# Patient Record
Sex: FEMALE | Race: WHITE | NOT HISPANIC OR LATINO | Employment: FULL TIME | URBAN - METROPOLITAN AREA
[De-identification: names, ages, dates, MRNs, and addresses within clinical notes are randomized per-mention and may not be internally consistent; named-entity substitution may affect disease eponyms.]

---

## 2018-01-09 NOTE — MISCELLANEOUS
Message   Recorded as Task   Date: 09/19/2016 03:30 PM, Created By: Kalin King   Task Name: Follow Up   Assigned To: [id number] [address] clinical,Team   Regarding Patient: Liz Pablo Status: Active   CommentBecky Lanier - 19 Sep 2016 3:30 PM     TASK CREATED  Pt  is S/P RT INTRA-ARTICULAR HIP INJ/BANZHOF on 9/14 by Dr Tasha Barragan  F/U is on 10 /03   Kalin King - 21 Sep 2016 11:59 AM     TASK EDITED   1st attempt to call, no answer  LMOM for CB  Leonid Carter - 26 Sep 2016 5:21 PM     TASK EDITED   2nd attempt to call, no answer  LMOM for CB  Leonid Carter - 27 Sep 2016 8:28 AM     TASK EDITED  Please send [address] letter  Leonid Carter - 27 Sep 2016 8:28 AM     TASK Ivis Rooney - 29 Sep 2016 10:22 AM     TASK Kizzy Plaza - 04 Oct 2016 11:25 AM     TASK REACTIVATED   Kalin King - 04 Oct 2016 11:27 AM     TASK EDITED  Pt called to report 95% relief post inj, with no s/s of infection and greatly improved ability to perform ADL's  Pt  f/u was on 10/03 with Dr Larry Granger which was a [facility] Class - 10 Oct 2016 7:50 AM     TASK REPLIED TO: Previously Assigned To [id number] [address] clinical,Team                      aware agree        Active Problems    1  Abnormal blood sugar (790 29) (R73 09)   2  Degenerative tear of acetabular labrum of right hip (715 95) (M16 9)   3  Essential familial hyperlipidemia (272 2) (E78 4)   4  Hip abductor tendinitis, right (726 5) (M76 891)   5  Hip pain, right (719 45) (M25 551)   6  Screening for breast cancer (V76 10) (Z12 39)   7  Screening for cholesterol level (V77 91) (Z13 220)   8  Screening for colon cancer (V76 51) (Z12 11)    Current Meds   1  Meloxicam 15 MG Oral Tablet; TAKE 1 TABLET DAILY WITH FOOD; Therapy: 03KOL9066 to (Evaluate:77Bwb4265); Last Rx:[id number] Ordered    Allergies    1   Tetracycline HCl CAPS    Signatures   Electronically signed by : Devon Schmitt RN; Oct 10 2016  8:47AM EST (Author)

## 2018-01-12 NOTE — RESULT NOTES
Verified Results  * XR HIP 2+ VIEW RIGHT [id number] 10:26AM Bevelyn Finer Order Number: [id number]     Test Name Result Flag Reference   * XR HIP/PELV 2-3 VWS RIGHT (Report)     RIGHT HIP     INDICATION: Lateral hip pain  Persistent pain after fall skiing  COMPARISON: None     VIEWS: AP pelvis and 2 coned down views of the hip; 3 images     FINDINGS:     There is no acute fracture or dislocation  Calcifications are noted about the hip joint which may suggest calcific tendinitis  Patient is also status post laminectomy at L5  No lytic or blastic lesions are seen  Soft tissues are unremarkable  IMPRESSION:     Degenerative changes right hip  No acute injury         Workstation performed: [id number]     Signed by:   Reyna Flores MD   7/7/16       Plan  Hip pain, right    · *1 - [facility]  Consult  Status: Hold For - Scheduling   Requested for: 49LGH0184  Care Summary provided  : Yes

## 2018-01-13 NOTE — RESULT NOTES
Message   Recorded as Task   Date: 08/26/2016 08:52 AM, Created By: Dayanna Chan   Task Name: Document Appointment   Assigned To: Dayanna Chan   Regarding Patient: Isamar Feng, Status: In Progress   Comment:    Gregory Su - 26 Aug 2016 8:52 AM     TASK CREATED  Called to r/s time of procedure on 09 14 2016 from 10:30am to 10:15am for 10:30am procedure  Sami Zhang I provided my direct line for contact and aware of being out of the office next week   if need to reach someone in the office to please call the main line ((number provided) to confirm change of time for procedure  Lucila Ward - 29 Aug 2016 2:38 PM     TASK IN PROGRESS   Hosea Dull - 31 Aug 2016 3:38 PM     TASK REPLIED TO: Previously Assigned To [facility] clerical,Team  PT AWARE AND CONFIRMED 10:15 ARRIVAL TIME          Signatures   Electronically signed by : Yolanda Mooney, ; Sep  6 2016 10:41AM EST                       (Author)

## 2018-01-13 NOTE — MISCELLANEOUS
Message   Recorded as Task   Date: 09/19/2016 03:30 PM, Created By: Olivia Encarnacion   Task Name: Follow Up   Assigned To: [facility] ,Team   Regarding Patient: Janeth Vázquez, Status: Active   CommentMarvell Robyn - 19 Sep 2016 3:30 PM     TASK CREATED  Pt  is S/P RT INTRA-ARTICULAR HIP INJ/BANZHOF on 9/14 by Dr Beth Elizalde  F/U is on 10 /03   Olivia Encarnacion - 21 Sep 2016 11:59 AM     TASK EDITED   1st attempt to call, no answer  LMOM for CB  Leonid Carter - 26 Sep 2016 5:21 PM     TASK EDITED   2nd attempt to call, no answer  LMOM for CB  Leonid Carter - 27 Sep 2016 8:28 AM     TASK EDITED  Please send [address] letter  Leonid Carter - 27 Sep 2016 8:28 AM     TASK EDITED   Letter Sent      Active Problems    1  Abnormal blood sugar (790 29) (R73 09)   2  Degenerative tear of acetabular labrum of right hip (715 95) (M16 9)   3  Essential familial hyperlipidemia (272 2) (E78 4)   4  Hip abductor tendinitis, right (726 5) (M76 891)   5  Hip pain, right (719 45) (M25 551)   6  Screening for breast cancer (V76 10) (Z12 39)   7  Screening for cholesterol level (V77 91) (Z13 220)   8  Screening for colon cancer (V76 51) (Z12 11)    Current Meds   1  Meloxicam 15 MG Oral Tablet; TAKE 1 TABLET DAILY WITH FOOD; Therapy: 17GWA1884 to (Evaluate:02Tnk2047); Last Rx:71Hca9487 Ordered    Allergies    1   Tetracycline HCl CAPS    Signatures   Electronically signed by : Romel Benavidez, ; Sep 29 2016 10:22AM EST                       (Author)

## 2018-01-17 NOTE — RESULT NOTES
Verified Results  MAMMO SCREENING BILATERAL W 3D & CAD [id number] 09:44AM [facility]     Test Name Result Flag Reference   MAMMO SCREENING BILATERAL W 3D & CAD (Report)     Patient History:   Patient is postmenopausal and had first child at age 32  Family history of unknown cancer in mother at age 77  Patient is a former smoker  Patient's BMI is 21 3  Reason for exam: screening (asymptomatic)  Mammo Screening Bilateral W DBT and CAD: February 5, 2016 - Check   In #: [de-identified]   2D/3D Procedure   3D views: Bilateral MLO view(s) were taken  2D views: Bilateral CC and XCCL view(s) were taken  Technologist: Terry Winn, RT(R)(M)   Prior study comparison: August 5, 2011, bilateral mammogram,    performed at [place] [address] [facility]  August 4, 2010,   bilateral mammogram, performed at [facility]  There are scattered fibroglandular densities  A combination of    mediolateral oblique 3-D tomographic slices as well as standard    two-dimensional orthogonal images were obtained  No dominant soft tissue mass, architectural distortion or    suspicious calcifications are noted  The skin and nipple    contours are within normal limits  No evidence of malignancy  No significant changes   when compared with prior studies  ASSESSMENT: BiRad:1 - Negative     Recommendation:   Routine screening mammogram of both breasts in 1 year  A    reminder letter will be scheduled  8-10% of cancers will be missed on mammography  Management of a    palpable abnormality must be based on clinical grounds  Patients    will be notified of their results via letter from our facility  Accredited by [employer] of [employer] and FDA       Transcription Location: [facility]: [id number]     Risk Value(s):   Tyrer-Cuzick 10 Year: 3 218%, Tyrer-Cuzick Lifetime: 10 545%,    Myriad Table: 1 5%, AUGIE 5 Year: 1 5%, NCI Lifetime: 10 2%   Signed by:   Reynaldo Damon MD 2/8/16

## 2020-10-08 ENCOUNTER — OFFICE VISIT (OUTPATIENT)
Dept: FAMILY MEDICINE CLINIC | Facility: CLINIC | Age: 60
End: 2020-10-08
Payer: COMMERCIAL

## 2020-10-08 VITALS
DIASTOLIC BLOOD PRESSURE: 90 MMHG | OXYGEN SATURATION: 98 % | WEIGHT: 177 LBS | HEART RATE: 74 BPM | TEMPERATURE: 98.4 F | SYSTOLIC BLOOD PRESSURE: 132 MMHG | HEIGHT: 69 IN | BODY MASS INDEX: 26.22 KG/M2

## 2020-10-08 DIAGNOSIS — Z00.00 ANNUAL PHYSICAL EXAM: Primary | ICD-10-CM

## 2020-10-08 DIAGNOSIS — Z78.0 POST-MENOPAUSAL: ICD-10-CM

## 2020-10-08 DIAGNOSIS — D22.9 MULTIPLE NEVI: ICD-10-CM

## 2020-10-08 DIAGNOSIS — Z13.0 SCREENING FOR DEFICIENCY ANEMIA: ICD-10-CM

## 2020-10-08 DIAGNOSIS — Z13.220 SCREENING FOR LIPID DISORDERS: ICD-10-CM

## 2020-10-08 DIAGNOSIS — Z76.89 ENCOUNTER TO ESTABLISH CARE: ICD-10-CM

## 2020-10-08 DIAGNOSIS — Z13.820 SCREENING FOR OSTEOPOROSIS: ICD-10-CM

## 2020-10-08 DIAGNOSIS — Z13.1 SCREENING FOR DIABETES MELLITUS: ICD-10-CM

## 2020-10-08 DIAGNOSIS — Z13.29 SCREENING FOR THYROID DISORDER: ICD-10-CM

## 2020-10-08 DIAGNOSIS — Z12.31 ENCOUNTER FOR SCREENING MAMMOGRAM FOR MALIGNANT NEOPLASM OF BREAST: ICD-10-CM

## 2020-10-08 PROCEDURE — 1036F TOBACCO NON-USER: CPT | Performed by: NURSE PRACTITIONER

## 2020-10-08 PROCEDURE — 99386 PREV VISIT NEW AGE 40-64: CPT | Performed by: NURSE PRACTITIONER

## 2020-10-08 PROCEDURE — 3725F SCREEN DEPRESSION PERFORMED: CPT | Performed by: NURSE PRACTITIONER

## 2020-10-30 DIAGNOSIS — D72.819 LEUKOPENIA, UNSPECIFIED TYPE: ICD-10-CM

## 2020-10-30 DIAGNOSIS — E78.5 HYPERLIPIDEMIA, UNSPECIFIED HYPERLIPIDEMIA TYPE: ICD-10-CM

## 2020-10-30 DIAGNOSIS — E03.8 SUBCLINICAL HYPOTHYROIDISM: Primary | ICD-10-CM

## 2021-01-14 DIAGNOSIS — D72.819 LEUKOPENIA, UNSPECIFIED TYPE: Primary | ICD-10-CM

## 2021-01-14 DIAGNOSIS — R79.89 ELEVATED TSH: ICD-10-CM

## 2021-01-14 DIAGNOSIS — E78.00 ELEVATED LDL CHOLESTEROL LEVEL: ICD-10-CM

## 2021-03-26 ENCOUNTER — OFFICE VISIT (OUTPATIENT)
Dept: FAMILY MEDICINE CLINIC | Facility: CLINIC | Age: 61
End: 2021-03-26
Payer: COMMERCIAL

## 2021-03-26 VITALS
TEMPERATURE: 97.5 F | OXYGEN SATURATION: 98 % | RESPIRATION RATE: 14 BRPM | HEART RATE: 72 BPM | SYSTOLIC BLOOD PRESSURE: 130 MMHG | DIASTOLIC BLOOD PRESSURE: 88 MMHG | BODY MASS INDEX: 26.36 KG/M2 | WEIGHT: 178 LBS | HEIGHT: 69 IN

## 2021-03-26 DIAGNOSIS — R00.2 PALPITATIONS: ICD-10-CM

## 2021-03-26 DIAGNOSIS — E78.00 ELEVATED LDL CHOLESTEROL LEVEL: Primary | ICD-10-CM

## 2021-03-26 PROCEDURE — 3008F BODY MASS INDEX DOCD: CPT | Performed by: NURSE PRACTITIONER

## 2021-03-26 PROCEDURE — 93000 ELECTROCARDIOGRAM COMPLETE: CPT | Performed by: NURSE PRACTITIONER

## 2021-03-26 PROCEDURE — 99214 OFFICE O/P EST MOD 30 MIN: CPT | Performed by: NURSE PRACTITIONER

## 2021-03-26 PROCEDURE — 1036F TOBACCO NON-USER: CPT | Performed by: NURSE PRACTITIONER

## 2021-03-27 NOTE — PROGRESS NOTES
[facility]    ASSESSMENT AND PLAN     1  Elevated LDL cholesterol level   recent lab work reviewed  LDL still remains mildly high at 159  But HDL great at 87  Triglycerides 100  Discussed continuing with healthy eating and exercise  She would like to avoid medication  Recheck again 6 months ,  Prior to annual physical     2  Palpitations    Complains today of palpitations  See history below  Assessment today unremarkable  In office EKG:  Sinus Ramon Love ;otherwise normal   (HR 55)  Discussed 48 hour Holter  Patient is going to her other home in [place] for the next few months  She would prefer to hold at this time, as her symptoms seem to have resolved  Advised she be evaluated there with further problems or concerns    - POCT ECG            SUBJECTIVE       Patient ID: Sun Ryan is a 61 y o  female  Chief Complaint   Patient presents with    Follow-up     review BW       HISTORY OF PRESENT ILLNESS    Patient presents today for routine checkup  Recent lab work completed  Complains of palpitations  Has been intermittent for several years  She did have a Holter monitor at 1 time several years ago  States she was away [place], [place]  Experienced several bouts of palpitations  Believes she may have been dehydrated as well  States it did improve with drinking water  She has had 1 or 2 episodes since home  Denies any chest pain /pressure / tightness or shortness of breath  No dizziness or headaches  Otherwise feels well with no new concerns        The following portions of the patient's history were reviewed and updated as appropriate: allergies, current medications, past family history, past medical history, past social history, past surgical history and problem list     REVIEW OF SYSTEMS  Review of Systems   Constitutional: Negative  HENT: Negative  Respiratory: Negative  Cardiovascular: Positive for palpitations  Negative for chest pain and leg swelling  Gastrointestinal: Negative  Genitourinary: Negative  Musculoskeletal: Negative  Neurological: Negative  Psychiatric/Behavioral: Negative  OBJECTIVE      VITAL SIGNS  /88 (BP Location: Left arm, Patient Position: Sitting, Cuff Size: Large)   Pulse 72   Temp 97 5 °F (36 4 °C) (Tympanic)   Resp 14   Ht 5' 8 9" (1 75 m)   Wt 80 7 kg (178 lb)   SpO2 98%   BMI 26 36 kg/m²     CURRENT MEDICATIONS  No current outpatient medications on file  PHYSICAL EXAMINATION   Physical Exam  Vitals signs and nursing note reviewed  Constitutional:       General: She is not in acute distress  Appearance: Normal appearance  She is well-developed and well-groomed  She is not ill-appearing  HENT:      Head: Normocephalic and atraumatic  Right Ear: Tympanic membrane, ear canal and external ear normal       Left Ear: Tympanic membrane, ear canal and external ear normal       Nose: Nose normal       Mouth/Throat:      Mouth: Mucous membranes are moist       Pharynx: Oropharynx is clear  Eyes:      Conjunctiva/sclera: Conjunctivae normal       Pupils: Pupils are equal, round, and reactive to light  Neck:      Vascular: No carotid bruit  Cardiovascular:      Rate and Rhythm: Normal rate and regular rhythm  No extrasystoles are present  Heart sounds: Normal heart sounds  Pulmonary:      Effort: Pulmonary effort is normal  No respiratory distress  Breath sounds: Normal breath sounds and air entry  Musculoskeletal:      Right lower leg: No edema  Left lower leg: No edema  Lymphadenopathy:      Cervical: No cervical adenopathy  Skin:     General: Skin is warm and dry  Neurological:      Mental Status: She is alert and oriented to person, place, and time     Psychiatric:         Attention and Perception: Attention normal          Mood and Affect: Mood normal          Speech: Speech normal          Behavior: Behavior normal

## 2021-07-06 ENCOUNTER — HOSPITAL ENCOUNTER (OUTPATIENT)
Dept: MAMMOGRAPHY | Facility: MEDICAL CENTER | Age: 61
Discharge: HOME | End: 2021-07-06
Payer: COMMERCIAL

## 2021-07-06 ENCOUNTER — HOSPITAL ENCOUNTER (OUTPATIENT)
Dept: BONE DENSITY | Facility: MEDICAL CENTER | Age: 61
Discharge: HOME | End: 2021-07-06
Payer: COMMERCIAL

## 2021-07-06 VITALS — BODY MASS INDEX: 24.88 KG/M2 | WEIGHT: 168 LBS | HEIGHT: 69 IN

## 2021-07-06 DIAGNOSIS — Z78.0 POST-MENOPAUSAL: ICD-10-CM

## 2021-07-06 DIAGNOSIS — Z12.31 ENCOUNTER FOR SCREENING MAMMOGRAM FOR MALIGNANT NEOPLASM OF BREAST: ICD-10-CM

## 2021-07-06 DIAGNOSIS — Z13.820 SCREENING FOR OSTEOPOROSIS: ICD-10-CM

## 2021-07-06 PROCEDURE — 77067 SCR MAMMO BI INCL CAD: CPT

## 2021-07-06 PROCEDURE — 77063 BREAST TOMOSYNTHESIS BI: CPT

## 2021-07-06 PROCEDURE — 77080 DXA BONE DENSITY AXIAL: CPT

## 2022-01-24 ENCOUNTER — LAB REQUISITION (OUTPATIENT)
Dept: LAB | Facility: HOSPITAL | Age: 62
End: 2022-01-24
Payer: COMMERCIAL

## 2022-01-24 DIAGNOSIS — K63.5 POLYP OF COLON: ICD-10-CM

## 2022-01-24 DIAGNOSIS — Z12.11 ENCOUNTER FOR SCREENING FOR MALIGNANT NEOPLASM OF COLON: ICD-10-CM

## 2022-01-24 PROCEDURE — 88305 TISSUE EXAM BY PATHOLOGIST: CPT | Performed by: PATHOLOGY

## 2022-06-22 PROCEDURE — 88305 TISSUE EXAM BY PATHOLOGIST: CPT | Performed by: PATHOLOGY

## 2022-06-23 ENCOUNTER — LAB REQUISITION (OUTPATIENT)
Dept: LAB | Facility: HOSPITAL | Age: 62
End: 2022-06-23
Payer: OTHER GOVERNMENT

## 2022-06-23 DIAGNOSIS — Z86.010 PERSONAL HISTORY OF COLONIC POLYPS: ICD-10-CM

## 2022-06-23 DIAGNOSIS — K63.5 POLYP OF COLON: ICD-10-CM

## 2023-02-08 ENCOUNTER — LAB REQUISITION (OUTPATIENT)
Dept: LAB | Facility: HOSPITAL | Age: 63
End: 2023-02-08

## 2023-02-08 DIAGNOSIS — K63.5 POLYP OF COLON: ICD-10-CM

## 2023-02-08 DIAGNOSIS — Z12.11 ENCOUNTER FOR SCREENING FOR MALIGNANT NEOPLASM OF COLON: ICD-10-CM

## 2023-11-24 ENCOUNTER — TELEPHONE (OUTPATIENT)
Dept: FAMILY MEDICINE CLINIC | Facility: CLINIC | Age: 63
End: 2023-11-24